# Patient Record
Sex: MALE | Race: WHITE | NOT HISPANIC OR LATINO | URBAN - METROPOLITAN AREA
[De-identification: names, ages, dates, MRNs, and addresses within clinical notes are randomized per-mention and may not be internally consistent; named-entity substitution may affect disease eponyms.]

---

## 2020-09-28 ENCOUNTER — OFFICE VISIT (OUTPATIENT)
Dept: URBAN - METROPOLITAN AREA CLINIC 35 | Facility: CLINIC | Age: 55
End: 2020-09-28

## 2020-09-28 VITALS
WEIGHT: 205 LBS | HEART RATE: 86 BPM | HEIGHT: 67 IN | OXYGEN SATURATION: 97 % | TEMPERATURE: 98.9 F | BODY MASS INDEX: 32.18 KG/M2

## 2020-09-28 RX ORDER — CIPROFLOXACIN HYDROCHLORIDE 500 MG/1
1 TABLET TABLET, FILM COATED ORAL
Qty: 20 | Refills: 0 | OUTPATIENT
Start: 2020-09-28

## 2020-09-28 RX ORDER — SODIUM SULFATE, POTASSIUM SULFATE, MAGNESIUM SULFATE 17.5; 3.13; 1.6 G/ML; G/ML; G/ML
AS DIRECTED FOR SPLIT DOSE REGIMEN SOLUTION, CONCENTRATE ORAL
Qty: 2 | Status: DISCONTINUED | COMMUNITY
Start: 2016-11-29

## 2020-09-28 RX ORDER — METRONIDAZOLE 500 MG/1
1 TABLET TABLET ORAL THREE TIMES A DAY
Qty: 30 | Refills: 0 | OUTPATIENT
Start: 2020-09-28

## 2020-09-28 NOTE — HPI-MIGRATED HPI
;     Diverticulitis : Patient admits lower left abdominal pain that is described as a bloating sharp severe pain.  Pain started Saturday 09/26/2020, he states that up until this morning the area was painful to touch. Patient admits that the Chinese food he had on Saturday aggravated his diverticulitis.  Patient admits to taking OTC Aleve for pain.  Denies any alleviating factors. He had a fever of 100.3 Saturday.  Admits he didn't have any complete bowel movements up until this morning.  Patient denies the use of antibiotics within the last (3-6) months.  The patient denies any hospital/ER visits for his pain.  Patient currently admits 2 bowel movements today.  Stools were formed and felt he completely emptied his bowels.  Patient denies heartburn.  Admits to gas, bloating.   Patient has history of divertciulitis, and has not had an episode since 06/2016.   This is his third episode.;

## 2020-09-28 NOTE — EXAM-MIGRATED EXAMINATIONS
GENERAL APPEARANCE: - alert, in no acute distress, well developed, well nourished;   HEAD: - normocephalic, atraumatic;   EYES: - sclera anicteric bilaterally;   EARS: - normal;   ORAL CAVITY: - mucosa moist, MP3;   THROAT: - clear;   NECK/THYROID: - neck supple, full range of motion, no cervical lymphadenopathy, no thyroid nodules, no thyromegaly, trachea midline;   LYMPH NODES: - no cervical adenopathy, no supraclavicular adenopathy, no periumbilical adenopathy;   SKIN: - no suspicious lesions, warm and dry, no spider angiomata, palmar erythema or icterus;   HEART: - no murmurs, regular rate and rhythm, S1, S2 normal;   LUNGS: - clear to auscultation bilaterally, good air movement, no wheezes, rales, rhonchi;   ABDOMEN: - bowel sounds present, no masses palpable, no organomegaly , no rebound tenderness, soft  +tenderness LLQ, nondistended;   RECTAL: - deferred by patient;   PSYCH: - cooperative with exam, mood/affect full range;

## 2020-10-12 ENCOUNTER — OFFICE VISIT (OUTPATIENT)
Dept: URBAN - METROPOLITAN AREA CLINIC 35 | Facility: CLINIC | Age: 55
End: 2020-10-12

## 2020-10-12 VITALS
SYSTOLIC BLOOD PRESSURE: 110 MMHG | BODY MASS INDEX: 31.71 KG/M2 | WEIGHT: 202 LBS | TEMPERATURE: 99.2 F | DIASTOLIC BLOOD PRESSURE: 75 MMHG | HEIGHT: 67 IN

## 2020-10-12 RX ORDER — CIPROFLOXACIN HYDROCHLORIDE 500 MG/1
1 TABLET TABLET, FILM COATED ORAL
Qty: 20 | Refills: 0 | Status: DISCONTINUED | COMMUNITY
Start: 2020-09-28

## 2020-10-12 RX ORDER — METRONIDAZOLE 500 MG/1
1 TABLET TABLET ORAL THREE TIMES A DAY
Qty: 30 | Refills: 0 | Status: DISCONTINUED | COMMUNITY
Start: 2020-09-28

## 2020-10-12 NOTE — HPI-MIGRATED HPI
;     Diverticulitis : Patient is a 55-year-old  male, who presents today to follow up for diverticulitis. He completed the course of Cipro Tablet, 500 MG, 1 tablet BID and Metronidazole Tablet, 500 MG TID with relief. Then the flare-up came back last [facility]. Admits severe LLQ pain and spams and darker stool since the last visit. Denies gas, bloating, fever, blood in stool, rectal bleeding, or diarrhea. He took Aleve yesterday for knee pain. He was having a loose stool while taking cipro and metronidazole. His last bowel movement was this morning with soft and formed stool.       Last visit (09/28/2020)                  Patient admits lower left abdominal pain that is described as a bloating sharp severe pain.  Pain started Saturday 09/26/2020, he states that up until this morning the area was painful to touch. Patient admits that the Chinese food he had on Saturday aggravated his diverticulitis.  Patient admits to taking OTC Aleve for pain.  Denies any alleviating factors. He had a fever of 100.3 Saturday.  Admits he didn't have any complete bowel movements up until this morning.  Patient denies the use of antibiotics within the last (3-6) months.  The patient denies any hospital/ER visits for his pain.  Patient currently admits 2 bowel movements today.  Stools were formed and felt he completely emptied his bowels.  Patient denies heartburn.  Admits to gas, bloating.   Patient has history of divertciulitis, and has not had an episode since 06/2016.   This is his third episode.;

## 2020-10-12 NOTE — EXAM-MIGRATED EXAMINATIONS
GENERAL APPEARANCE: - alert, well hydrated, in no distress ;   ORAL CAVITY: - mucosa moist;   HEART: - S1, S2 normal, regular rate and rhythm;   LUNGS: - clear to auscultation bilaterally;   ABDOMEN: - soft, +tenderness LLQ, extremely tender, distended, no rebound tenderness, bowel sounds present;

## 2020-10-15 ENCOUNTER — TELEPHONE ENCOUNTER (OUTPATIENT)
Dept: URBAN - METROPOLITAN AREA CLINIC 35 | Facility: CLINIC | Age: 55
End: 2020-10-15

## 2020-11-04 ENCOUNTER — TELEPHONE ENCOUNTER (OUTPATIENT)
Dept: URBAN - METROPOLITAN AREA CLINIC 35 | Facility: CLINIC | Age: 55
End: 2020-11-04

## 2020-11-04 RX ORDER — SODIUM, POTASSIUM,MAG SULFATES 17.5-3.13G
17.5-3.13-1.6 GM/177ML SOLUTION, RECONSTITUTED, ORAL ORAL AS DIRECTED
Qty: 1 KIT | Refills: 0 | OUTPATIENT
Start: 2020-11-05

## 2020-11-12 ENCOUNTER — TELEPHONE ENCOUNTER (OUTPATIENT)
Dept: URBAN - METROPOLITAN AREA CLINIC 35 | Facility: CLINIC | Age: 55
End: 2020-11-12

## 2020-11-13 ENCOUNTER — LAB OUTSIDE AN ENCOUNTER (OUTPATIENT)
Dept: URBAN - METROPOLITAN AREA SURGERY CENTER 8 | Facility: SURGERY CENTER | Age: 55
End: 2020-11-13

## 2020-11-16 ENCOUNTER — TELEPHONE ENCOUNTER (OUTPATIENT)
Dept: URBAN - METROPOLITAN AREA CLINIC 35 | Facility: CLINIC | Age: 55
End: 2020-11-16

## 2020-11-17 ENCOUNTER — OFFICE VISIT (OUTPATIENT)
Dept: URBAN - METROPOLITAN AREA CLINIC 33 | Facility: CLINIC | Age: 55
End: 2020-11-17

## 2020-11-17 VITALS
SYSTOLIC BLOOD PRESSURE: 130 MMHG | HEIGHT: 67 IN | BODY MASS INDEX: 30.29 KG/M2 | WEIGHT: 193 LBS | TEMPERATURE: 96.4 F | DIASTOLIC BLOOD PRESSURE: 80 MMHG

## 2020-11-17 PROBLEM — 111359004 DIVERTICULITIS OF COLON: Status: ACTIVE | Noted: 2020-09-28

## 2020-11-17 PROBLEM — 62315008 DIARRHEA: Status: ACTIVE | Noted: 2020-11-17

## 2020-11-17 RX ORDER — SODIUM, POTASSIUM,MAG SULFATES 17.5-3.13G
17.5-3.13-1.6 GM/177ML SOLUTION, RECONSTITUTED, ORAL ORAL AS DIRECTED
Qty: 1 KIT | Refills: 0 | Status: DISCONTINUED | COMMUNITY
Start: 2020-11-05

## 2020-11-17 NOTE — HPI-MIGRATED HPI
;   ;     Diverticulitis : He was admitted to [place] from 10/12/2020 to 10/16/2020. 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 He had ABD CT drain plcmnt peritoneal on 10/15/2020 with impression of successful CT-guided drainage of left diverticular abscess.  Last visit (10/12/2020)          Patient is a 55-year-old  male, who presents today to follow up for diverticulitis. He completed the course of Cipro Tablet, 500 MG, 1 tablet BID and Metronidazole Tablet, 500 MG TID with relief. Then the flare-up came back last [facility]. Admits severe LLQ pain and spams and darker stool since the last visit. Denies gas, bloating, fever, blood in stool, rectal bleeding, or diarrhea. He took Aleve yesterday for knee pain. He was having a loose stool while taking cipro and metronidazole. His last bowel movement was this morning with soft and formed stool.       Last visit (09/28/2020)                  Patient admits lower left abdominal pain that is described as a bloating sharp severe pain.  Pain started Saturday 09/26/2020, he states that up until this morning the area was painful to touch. Patient admits that the Chinese food he had on Saturday aggravated his diverticulitis.  Patient admits to taking OTC Aleve for pain.  Denies any alleviating factors. He had a fever of 100.3 Saturday.  Admits he didn't have any complete bowel movements up until this morning.  Patient denies the use of antibiotics within the last (3-6) months.  The patient denies any hospital/ER visits for his pain.  Patient currently admits 2 bowel movements today.  Stools were formed and felt he completely emptied his bowels.  Patient denies heartburn.  Admits to gas, bloating.   Patient has history of divertciulitis, and has not had an episode since 06/2016.   This is his third episode.;   Change in Bowel habits : Patient is a 55-year-old  male, who presents today for change in bowel habits. He was having a BM every 1-2 hours with loose but somewhat formed stool from Saturday, 11/14/2020 to Monday, 11/16/2020. Before he started having a lot of gas. He was traveling last weekend and had a Dashawn bar, apple slices, and breakfast from [employer]. Everything else was normal diet. He had a regular BM last night. Then, sxs restarted after having eggs for breakfast this morning. He had chicken pasta for lunch today and had a loose but somewhat formed BM once.   He also has RUQ tenderness and mucus in stool. He also feels like he does not have complete evacuation of stools. He had myalgia yesterday then today he has more so of joint pain in elbow and wrist. Denies nausea, vomiting, gas, fever, melena or blood in stool, and weight loss. He was on a full month of antibiotics after admitted to the hospital in 10/2020 for perforation, colon abscess, and diverticulitis. ;

## 2020-11-17 NOTE — EXAM-MIGRATED EXAMINATIONS
GENERAL APPEARANCE: - alert, in no acute distress, well developed, well nourished;   ABDOMEN: - bowel sounds present, no masses palpable, no organomegaly , no rebound tenderness, soft, nontender, nondistended;

## 2020-11-19 ENCOUNTER — OFFICE VISIT (OUTPATIENT)
Dept: URBAN - METROPOLITAN AREA CLINIC 35 | Facility: CLINIC | Age: 55
End: 2020-11-19

## 2020-11-23 ENCOUNTER — OFFICE VISIT (OUTPATIENT)
Dept: URBAN - METROPOLITAN AREA SURGERY CENTER 8 | Facility: SURGERY CENTER | Age: 55
End: 2020-11-23

## 2020-12-02 ENCOUNTER — OFFICE VISIT (OUTPATIENT)
Dept: URBAN - METROPOLITAN AREA SURGERY CENTER 8 | Facility: SURGERY CENTER | Age: 55
End: 2020-12-02

## 2020-12-03 ENCOUNTER — OFFICE VISIT (OUTPATIENT)
Dept: URBAN - METROPOLITAN AREA CLINIC 35 | Facility: CLINIC | Age: 55
End: 2020-12-03

## 2020-12-03 VITALS
WEIGHT: 190 LBS | BODY MASS INDEX: 29.82 KG/M2 | HEIGHT: 67 IN | DIASTOLIC BLOOD PRESSURE: 80 MMHG | SYSTOLIC BLOOD PRESSURE: 140 MMHG

## 2020-12-03 PROBLEM — 307496006 DIVERTICULITIS: Status: ACTIVE | Noted: 2020-12-03

## 2020-12-03 NOTE — HPI-MIGRATED HPI
;   ;     Diverticulitis :      Last visit (11/17/2020) He was admitted to [place] from 10/12/2020 to 10/16/2020. 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 He had ABD CT drain plcmnt peritoneal on 10/15/2020 with impression of successful CT-guided drainage of left diverticular abscess.  Last visit (10/12/2020)          Patient is a 55-year-old  male, who presents today to follow up for diverticulitis. He completed the course of Cipro Tablet, 500 MG, 1 tablet BID and Metronidazole Tablet, 500 MG TID with relief. Then the flare-up came back last [facility]. Admits severe LLQ pain and spams and darker stool since the last visit. Denies gas, bloating, fever, blood in stool, rectal bleeding, or diarrhea. He took Aleve yesterday for knee pain. He was having a loose stool while taking cipro and metronidazole. His last bowel movement was this morning with soft and formed stool.       Last visit (09/28/2020)                  Patient admits lower left abdominal pain that is described as a bloating sharp severe pain.  Pain started Saturday 09/26/2020, he states that up until this morning the area was painful to touch. Patient admits that the Chinese food he had on Saturday aggravated his diverticulitis.  Patient admits to taking OTC Aleve for pain.  Denies any alleviating factors. He had a fever of 100.3 Saturday.  Admits he didn't have any complete bowel movements up until this morning.  Patient denies the use of antibiotics within the last (3-6) months.  The patient denies any hospital/ER visits for his pain.  Patient currently admits 2 bowel movements today.  Stools were formed and felt he completely emptied his bowels.  Patient denies heartburn.  Admits to gas, bloating.   Patient has history of divertciulitis, and has not had an episode since 06/2016.   This is his third episode.;   Change in Bowel habits : Patient admits 4 formed and solid bowel movements per day with no mucus, melena, or blood in stools. Patient denies abdominal RUQ tenderness.    Last visit (11/17/2020) Patient is a 55-year-old  male, who presents today for change in bowel habits. He was having a BM every 1-2 hours with loose but somewhat formed stool from Saturday, 11/14/2020 to Monday, 11/16/2020. Before he started having a lot of gas. He was traveling last weekend and had a Dashawn bar, apple slices, and breakfast from [employer]. Everything else was normal diet. He had a regular BM last night. Then, sxs restarted after having eggs for breakfast this morning. He had chicken pasta for lunch today and had a loose but somewhat formed BM once.   He also has RUQ tenderness and mucus in stool. He also feels like he does not have complete evacuation of stools. He had myalgia yesterday then today he has more so of joint pain in elbow and wrist. Denies nausea, vomiting, gas, fever, melena or blood in stool, and weight loss. He was on a full month of antibiotics after admitted to the hospital in 10/2020 for perforation, colon abscess, and diverticulitis. ;

## 2020-12-03 NOTE — EXAM-MIGRATED EXAMINATIONS
GENERAL APPEARANCE: - alert, no acute distress,  well developed, well nourished;   HEAD: - normocephalic, atraumatic;   EYES: - sclera anicteric bilaterally;   ORAL CAVITY: - mucosa moist;   THROAT: - clear;   HEART: - no murmurs, regular rate and rhythm, S1, S2 normal;   LUNGS: - clear to auscultation bilaterally, good air movement, no wheezes, rales, rhonchi;   ABDOMEN: - bowel sounds present,  no masses palpable,  no organomegaly,  no rebound tenderness,  soft, nontender, nondistended, ventral hernia present;   MUSCULOSKELETAL: - normal posture, normal gait and station, no decreased range of motion;   EXTREMITIES: - no clubbing, cyanosis or edema;   PSYCH: - cooperative with exam, mood/affect full range;

## 2020-12-09 ENCOUNTER — OFFICE VISIT (OUTPATIENT)
Dept: URBAN - METROPOLITAN AREA SURGERY CENTER 8 | Facility: SURGERY CENTER | Age: 55
End: 2020-12-09

## 2020-12-18 ENCOUNTER — TELEPHONE ENCOUNTER (OUTPATIENT)
Dept: URBAN - METROPOLITAN AREA CLINIC 35 | Facility: CLINIC | Age: 55
End: 2020-12-18

## 2020-12-18 PROBLEM — 111359004 DIVERTICULITIS OF COLON: Status: ACTIVE | Noted: 2020-12-18

## 2020-12-21 ENCOUNTER — OFFICE VISIT (OUTPATIENT)
Dept: URBAN - METROPOLITAN AREA CLINIC 33 | Facility: CLINIC | Age: 55
End: 2020-12-21

## 2020-12-21 ENCOUNTER — TELEPHONE ENCOUNTER (OUTPATIENT)
Dept: URBAN - METROPOLITAN AREA CLINIC 35 | Facility: CLINIC | Age: 55
End: 2020-12-21

## 2020-12-21 VITALS
TEMPERATURE: 98.1 F | HEART RATE: 78 BPM | OXYGEN SATURATION: 97 % | HEIGHT: 67 IN | BODY MASS INDEX: 30.92 KG/M2 | SYSTOLIC BLOOD PRESSURE: 148 MMHG | DIASTOLIC BLOOD PRESSURE: 92 MMHG | WEIGHT: 197 LBS

## 2020-12-21 RX ORDER — BACILLUS COAGULANS/LACTASE 500MM-3000
AS DIRECTED CAPSULE ORAL
Status: ACTIVE | COMMUNITY

## 2020-12-21 RX ORDER — MESALAMINE 1.2 G/1
2 TABLETS TABLET, DELAYED RELEASE ORAL ONCE A DAY
Qty: 60 TABLET | Refills: 6 | OUTPATIENT
Start: 2020-12-21

## 2020-12-21 NOTE — HPI-MIGRATED HPI
;   ;   ;     Diverticulitis :      Last visit (11/17/2020) He was admitted to [place] from 10/12/2020 to 10/16/2020. 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 He had ABD CT drain plcmnt peritoneal on 10/15/2020 with impression of successful CT-guided drainage of left diverticular abscess.  Last visit (10/12/2020)          Patient is a 55-year-old  male, who presents today to follow up for diverticulitis. He completed the course of Cipro Tablet, 500 MG, 1 tablet BID and Metronidazole Tablet, 500 MG TID with relief. Then the flare-up came back last [facility]. Admits severe LLQ pain and spams and darker stool since the last visit. Denies gas, bloating, fever, blood in stool, rectal bleeding, or diarrhea. He took Aleve yesterday for knee pain. He was having a loose stool while taking cipro and metronidazole. His last bowel movement was this morning with soft and formed stool.       Last visit (09/28/2020)                  Patient admits lower left abdominal pain that is described as a bloating sharp severe pain.  Pain started Saturday 09/26/2020, he states that up until this morning the area was painful to touch. Patient admits that the Chinese food he had on Saturday aggravated his diverticulitis.  Patient admits to taking OTC Aleve for pain.  Denies any alleviating factors. He had a fever of 100.3 Saturday.  Admits he didn't have any complete bowel movements up until this morning.  Patient denies the use of antibiotics within the last (3-6) months.  The patient denies any hospital/ER visits for his pain.  Patient currently admits 2 bowel movements today.  Stools were formed and felt he completely emptied his bowels.  Patient denies heartburn.  Admits to gas, bloating.   Patient has history of divertciulitis, and has not had an episode since 06/2016.   This is his third episode.;   Colonoscopy Follow-Up : Patient presents today for follow up to his colonoscopy  and CT Scan which was completed on (12/09/2020) by Dr. William Cao.  Patient denies any complications after his procedure. Patient currently admits 2-4 formed bowel movements.  Patient denies any melena, blood or mucus in stools. Patient denies any associated abdominal pain, bloating, or gas. Patient stated he didn't need to get the CT scan done ordered by Dr Stevens.   Colonoscopy report shows: Inflamed mucosa in the descending colon, in the sigmoid colon and in the rectum.  Diverticulosis in the sigmoid colon, in the descending colon, in the transverse colon and in the ascending colon. Preparation of the colon was fair. ;   Change in Bowel habits : Patient admits 4 formed and solid bowel movements per day with no mucus, melena, or blood in stools. Patient denies abdominal RUQ tenderness.    Last visit (11/17/2020) Patient is a 55-year-old  male, who presents today for change in bowel habits. He was having a BM every 1-2 hours with loose but somewhat formed stool from Saturday, 11/14/2020 to Monday, 11/16/2020. Before he started having a lot of gas. He was traveling last weekend and had a Dashawn bar, apple slices, and breakfast from [employer]. Everything else was normal diet. He had a regular BM last night. Then, sxs restarted after having eggs for breakfast this morning. He had chicken pasta for lunch today and had a loose but somewhat formed BM once.   He also has RUQ tenderness and mucus in stool. He also feels like he does not have complete evacuation of stools. He had myalgia yesterday then today he has more so of joint pain in elbow and wrist. Denies nausea, vomiting, gas, fever, melena or blood in stool, and weight loss. He was on a full month of antibiotics after admitted to the hospital in 10/2020 for perforation, colon abscess, and diverticulitis. ;

## 2021-07-20 ENCOUNTER — TELEPHONE ENCOUNTER (OUTPATIENT)
Dept: URBAN - METROPOLITAN AREA CLINIC 35 | Facility: CLINIC | Age: 56
End: 2021-07-20

## 2021-07-20 RX ORDER — MESALAMINE 1.2 G/1
2 TABLETS TABLET, DELAYED RELEASE ORAL ONCE A DAY
Qty: 60 TABLET | Refills: 6 | OUTPATIENT
Start: 2020-12-21

## 2022-02-16 ENCOUNTER — TELEPHONE ENCOUNTER (OUTPATIENT)
Dept: URBAN - METROPOLITAN AREA CLINIC 36 | Facility: CLINIC | Age: 57
End: 2022-02-16

## 2022-02-16 RX ORDER — MESALAMINE 1.2 G/1
2 TABLETS TABLET, DELAYED RELEASE ORAL ONCE A DAY
Qty: 60 TABLET | Refills: 0
Start: 2020-12-21 | End: 2022-03-18

## 2022-02-18 ENCOUNTER — TELEPHONE ENCOUNTER (OUTPATIENT)
Dept: URBAN - METROPOLITAN AREA CLINIC 33 | Facility: CLINIC | Age: 57
End: 2022-02-18

## 2022-02-18 ENCOUNTER — OFFICE VISIT (OUTPATIENT)
Dept: URBAN - METROPOLITAN AREA CLINIC 33 | Facility: CLINIC | Age: 57
End: 2022-02-18

## 2022-02-21 ENCOUNTER — OFFICE VISIT (OUTPATIENT)
Dept: URBAN - METROPOLITAN AREA CLINIC 36 | Facility: CLINIC | Age: 57
End: 2022-02-21

## 2022-02-21 NOTE — HPI-TODAY'S VISIT:
57 y/o male patient presents today with longstanding ulcerative colitis/Crohns. Patient was first diagnosed in --- . Patient admits ?  to a different range of abdominal pain/cramps from mild to severe. Patient admits/denies associated symptoms such as rectal bleeding, bloody diarrhea, abdominal cramps/pain, diarrhea, weight loss, anemia, and fatigue   Patient continues to take Mesalamine?????' Patient currently admits __ bowel movements per day. Patient denies/admits associated abdominal pain, bloating, gas, or  heartburn. Patient admits/denies aggravating factors as ---- and/or alleviating factors as ---- .  Patient's last Entyvio/Humira/Remicade infusion of --- was ---.   Patient admits having a colonoscopy/EGD in --- by  ---- with --  findings.

## 2022-02-23 ENCOUNTER — TELEPHONE ENCOUNTER (OUTPATIENT)
Dept: URBAN - METROPOLITAN AREA CLINIC 35 | Facility: CLINIC | Age: 57
End: 2022-02-23

## 2022-02-24 ENCOUNTER — OFFICE VISIT (OUTPATIENT)
Dept: URBAN - METROPOLITAN AREA CLINIC 35 | Facility: CLINIC | Age: 57
End: 2022-02-24
Payer: COMMERCIAL

## 2022-02-24 ENCOUNTER — LAB OUTSIDE AN ENCOUNTER (OUTPATIENT)
Dept: URBAN - METROPOLITAN AREA CLINIC 35 | Facility: CLINIC | Age: 57
End: 2022-02-24

## 2022-02-24 VITALS
OXYGEN SATURATION: 96 % | BODY MASS INDEX: 30.92 KG/M2 | WEIGHT: 197 LBS | HEART RATE: 86 BPM | SYSTOLIC BLOOD PRESSURE: 155 MMHG | HEIGHT: 67 IN | DIASTOLIC BLOOD PRESSURE: 90 MMHG

## 2022-02-24 DIAGNOSIS — K52.9 COLITIS: ICD-10-CM

## 2022-02-24 DIAGNOSIS — K57.90 DIVERTICULOSIS: ICD-10-CM

## 2022-02-24 PROBLEM — 397881000: Status: ACTIVE | Noted: 2022-02-24

## 2022-02-24 PROCEDURE — 99213 OFFICE O/P EST LOW 20 MIN: CPT | Performed by: INTERNAL MEDICINE

## 2022-02-24 RX ORDER — MESALAMINE 1.2 G/1
2 TABLETS TABLET, DELAYED RELEASE ORAL ONCE A DAY
Qty: 60 TABLET | Refills: 0 | Status: ACTIVE | COMMUNITY
Start: 2020-12-21 | End: 2022-03-18

## 2022-02-24 RX ORDER — BACILLUS COAGULANS/LACTASE 500MM-3000
AS DIRECTED CAPSULE ORAL
Status: ON HOLD | COMMUNITY

## 2022-02-24 RX ORDER — SODIUM PICOSULFATE, MAGNESIUM OXIDE, AND ANHYDROUS CITRIC ACID 10; 3.5; 12 MG/160ML; G/160ML; G/160ML
160 ML LIQUID ORAL
Qty: 320 MILLILITER | Refills: 0 | OUTPATIENT
Start: 2022-02-24 | End: 2022-02-26

## 2022-02-24 NOTE — HPI-TODAY'S VISIT:
57 y/o male patient presents today with longstanding colitis. Patient was previously diagnosed with diverticulitis on 10/12/2020 after getting a CT scan of the abdomen at [facility].   Patient denies associated symptoms such as abdominal pain/cramps  rectal bleeding, bloody diarrhea, abdominal cramps/pain, diarrhea, weight loss, anemia, or fatigue.  Patient notes that he did experience one episode of flare up after having some popcorn.  Patient currently admits 2-3 bowel soft movements per day. Patient denies associated abdominal pain, bloating, gas, or heartburn.   Patient had a colonoscopy with  on 12/10/2020 with findings of focal active colitis. He was advised to start Mesalamine 1.2 GM and continues to take this medication daily, and continues to take the medications with relief of symptoms.

## 2022-02-25 ENCOUNTER — TELEPHONE ENCOUNTER (OUTPATIENT)
Dept: URBAN - METROPOLITAN AREA CLINIC 36 | Facility: CLINIC | Age: 57
End: 2022-02-25

## 2022-02-28 ENCOUNTER — TELEPHONE ENCOUNTER (OUTPATIENT)
Dept: URBAN - METROPOLITAN AREA CLINIC 36 | Facility: CLINIC | Age: 57
End: 2022-02-28

## 2022-03-21 ENCOUNTER — TELEPHONE ENCOUNTER (OUTPATIENT)
Dept: URBAN - METROPOLITAN AREA CLINIC 35 | Facility: CLINIC | Age: 57
End: 2022-03-21

## 2022-03-21 RX ORDER — MESALAMINE 1.2 G/1
2 TABLETS WITH A MEAL TABLET, DELAYED RELEASE ORAL ONCE A DAY
Qty: 60 | Refills: 5 | OUTPATIENT
Start: 2022-03-21 | End: 2022-09-17

## 2022-03-21 RX ORDER — BACILLUS COAGULANS/LACTASE 500MM-3000
AS DIRECTED CAPSULE ORAL
Status: ON HOLD | COMMUNITY

## 2022-03-24 ENCOUNTER — OFFICE VISIT (OUTPATIENT)
Dept: URBAN - METROPOLITAN AREA SURGERY CENTER 8 | Facility: SURGERY CENTER | Age: 57
End: 2022-03-24

## 2022-03-29 ENCOUNTER — OFFICE VISIT (OUTPATIENT)
Dept: URBAN - METROPOLITAN AREA SURGERY CENTER 8 | Facility: SURGERY CENTER | Age: 57
End: 2022-03-29
Payer: COMMERCIAL

## 2022-03-29 DIAGNOSIS — K52.89 (LYMPHOCYTIC) MICROSCOPIC COLITIS: ICD-10-CM

## 2022-03-29 DIAGNOSIS — K63.89 BACTERIAL OVERGROWTH SYNDROME: ICD-10-CM

## 2022-03-29 PROCEDURE — 45380 COLONOSCOPY AND BIOPSY: CPT | Performed by: INTERNAL MEDICINE

## 2022-03-29 PROCEDURE — G8907 PT DOC NO EVENTS ON DISCHARG: HCPCS | Performed by: INTERNAL MEDICINE

## 2022-04-14 ENCOUNTER — OFFICE VISIT (OUTPATIENT)
Dept: URBAN - METROPOLITAN AREA CLINIC 35 | Facility: CLINIC | Age: 57
End: 2022-04-14

## 2022-04-14 NOTE — HPI-COLONOSCOPY FOLLOWUP
55 y/o male patient presents today for follow up to his repeat colonoscopy, which was completed on (03/29/2022) by Dr. William Cao. Patient has a hx of colitis, which was diagnosed by  during his previous colonoscopy on 12/10/2020. Patient has been taking Mesalamine 1.2 GM daily with control of symptoms. Patient currently denies associated symptoms of abdominal pain/cramps, rectal bleeding, bloody diarrhea, weight loss, anemia, or fatigue.  Patient admits/denies any complications after his procedure. Patient currently admits __ formed bowel movements per day. Patient denies any melena, blood or mucus in stools. Patient denies any associated heartburn, bloating, or gas.   Colonoscopy report shows: - Preparation of the colon was fair. - Diverticulosis in the sigmoid colon, in the descending colon, in the transverse colon and in the ascending colon. Biopsied. - The examination was otherwise normal on direct and retroflexion views.

## 2022-10-04 ENCOUNTER — TELEPHONE ENCOUNTER (OUTPATIENT)
Dept: URBAN - METROPOLITAN AREA CLINIC 6 | Facility: CLINIC | Age: 57
End: 2022-10-04

## 2022-10-04 RX ORDER — MESALAMINE 1.2 G/1
2 TABLETS WITH A MEAL TABLET, DELAYED RELEASE ORAL ONCE A DAY
Qty: 60 | Refills: 11
Start: 2022-03-21 | End: 2023-10-01

## 2022-10-08 ENCOUNTER — TELEPHONE ENCOUNTER (OUTPATIENT)
Dept: URBAN - METROPOLITAN AREA CLINIC 3 | Facility: CLINIC | Age: 57
End: 2022-10-08

## 2022-10-10 ENCOUNTER — TELEPHONE ENCOUNTER (OUTPATIENT)
Dept: URBAN - METROPOLITAN AREA CLINIC 12 | Facility: CLINIC | Age: 57
End: 2022-10-10

## 2022-10-10 RX ORDER — MESALAMINE 1.2 G/1
2 TABLETS WITH A MEAL TABLET, DELAYED RELEASE ORAL ONCE A DAY
Qty: 60 | Refills: 1 | OUTPATIENT
Start: 2022-10-10 | End: 2022-12-08

## 2023-10-09 ENCOUNTER — CLAIMS CREATED FROM THE CLAIM WINDOW (OUTPATIENT)
Dept: URBAN - METROPOLITAN AREA CLINIC 35 | Facility: CLINIC | Age: 58
End: 2023-10-09
Payer: COMMERCIAL

## 2023-10-09 ENCOUNTER — DASHBOARD ENCOUNTERS (OUTPATIENT)
Age: 58
End: 2023-10-09

## 2023-10-09 VITALS
BODY MASS INDEX: 31.08 KG/M2 | WEIGHT: 198 LBS | SYSTOLIC BLOOD PRESSURE: 122 MMHG | DIASTOLIC BLOOD PRESSURE: 78 MMHG | HEIGHT: 67 IN

## 2023-10-09 DIAGNOSIS — K57.92 DIVERTICULITIS: ICD-10-CM

## 2023-10-09 DIAGNOSIS — K57.90 DIVERTICULOSIS: ICD-10-CM

## 2023-10-09 DIAGNOSIS — K52.9 ACUTE AND CHRONIC COLITIS: ICD-10-CM

## 2023-10-09 PROCEDURE — 99214 OFFICE O/P EST MOD 30 MIN: CPT | Performed by: PHYSICIAN ASSISTANT

## 2023-10-09 RX ORDER — BACILLUS COAGULANS/LACTASE 500MM-3000
AS DIRECTED CAPSULE ORAL
Status: ON HOLD | COMMUNITY

## 2023-10-09 RX ORDER — MESALAMINE 1.2 G/1
2 TABLETS WITH A MEAL TABLET, DELAYED RELEASE ORAL ONCE A DAY
Qty: 60 | Refills: 3 | OUTPATIENT
Start: 2023-10-23 | End: 2024-02-19

## 2023-10-09 NOTE — HPI-COLITIS
58 Year old male patient presents today for a follow up of his colitis, diagnosed in December 2020.  The findings were focal nonspecific colitis in the sigmoid colon. He denies any recent flare ups. Patient currently denies any change in bowel habits or appetite, difficulty emptying bowels, fecal urgency, fecal incontinence, abdominal pain/cramping, nausea, fever or fatigue. He currently reports 2 bowel movements per day, without strain. His stools are formed. He admits occasional blood due to hemorrhoids and denies any mucus or melena in stools. He denies any pruritus ani or rectal pain. Admits current use of Mesalamine and would like a refill.   Of note,  Pt had perforated diverticulitis 10/12/20 seen on CT, with subsequent CT guided drainage of abscess. Pt had colonoscopy 03/2022 with findings of benign colonic mucosa, normal.    Most recent labs were performed 01/26/2023 ordered by Dr Anne/[facility].  Hemoglobin 18.6H Hematocrit 52.5H Creatinine 1.52H eGFR 54L Chelesterol Potassium 5.5H HDL 36L LDL Chol Calc 119H

## 2023-10-23 ENCOUNTER — TELEPHONE ENCOUNTER (OUTPATIENT)
Dept: URBAN - METROPOLITAN AREA CLINIC 35 | Facility: CLINIC | Age: 58
End: 2023-10-23

## 2024-03-26 ENCOUNTER — OV EP (OUTPATIENT)
Dept: URBAN - METROPOLITAN AREA CLINIC 35 | Facility: CLINIC | Age: 59
End: 2024-03-26